# Patient Record
Sex: MALE | Race: ASIAN | Employment: FULL TIME | URBAN - METROPOLITAN AREA
[De-identification: names, ages, dates, MRNs, and addresses within clinical notes are randomized per-mention and may not be internally consistent; named-entity substitution may affect disease eponyms.]

---

## 2024-04-08 ENCOUNTER — HOSPITAL ENCOUNTER (EMERGENCY)
Facility: HOSPITAL | Age: 39
Discharge: HOME | End: 2024-04-09
Attending: STUDENT IN AN ORGANIZED HEALTH CARE EDUCATION/TRAINING PROGRAM
Payer: COMMERCIAL

## 2024-04-08 DIAGNOSIS — J10.1 INFLUENZA B: Primary | ICD-10-CM

## 2024-04-08 LAB
FLUAV RNA SPEC QL NAA+PROBE: NOT DETECTED
FLUBV RNA SPEC QL NAA+PROBE: DETECTED
SARS-COV-2 RNA RESP QL NAA+PROBE: NOT DETECTED

## 2024-04-08 PROCEDURE — 6370000000 HC RX 637 (ALT 250 FOR IP)

## 2024-04-08 PROCEDURE — 87636 SARSCOV2 & INF A&B AMP PRB: CPT

## 2024-04-08 PROCEDURE — 93005 ELECTROCARDIOGRAM TRACING: CPT | Performed by: STUDENT IN AN ORGANIZED HEALTH CARE EDUCATION/TRAINING PROGRAM

## 2024-04-08 PROCEDURE — 99284 EMERGENCY DEPT VISIT MOD MDM: CPT

## 2024-04-08 RX ORDER — GUAIFENESIN 600 MG/1
1200 TABLET, EXTENDED RELEASE ORAL 2 TIMES DAILY
Qty: 20 TABLET | Refills: 0 | Status: SHIPPED | OUTPATIENT
Start: 2024-04-08 | End: 2024-04-13

## 2024-04-08 RX ORDER — ACETAMINOPHEN 500 MG
1000 TABLET ORAL ONCE
Status: COMPLETED | OUTPATIENT
Start: 2024-04-08 | End: 2024-04-08

## 2024-04-08 RX ORDER — IBUPROFEN 600 MG/1
600 TABLET ORAL
Status: COMPLETED | OUTPATIENT
Start: 2024-04-08 | End: 2024-04-08

## 2024-04-08 RX ORDER — IBUPROFEN 600 MG/1
600 TABLET ORAL 3 TIMES DAILY PRN
Qty: 30 TABLET | Refills: 0 | Status: SHIPPED | OUTPATIENT
Start: 2024-04-08

## 2024-04-08 RX ADMIN — ACETAMINOPHEN 1000 MG: 500 TABLET ORAL at 23:15

## 2024-04-08 RX ADMIN — IBUPROFEN 600 MG: 600 TABLET, FILM COATED ORAL at 23:15

## 2024-04-08 ASSESSMENT — PAIN SCALES - GENERAL: PAINLEVEL_OUTOF10: 5

## 2024-04-08 ASSESSMENT — PAIN DESCRIPTION - LOCATION: LOCATION: GENERALIZED

## 2024-04-08 ASSESSMENT — PAIN DESCRIPTION - DESCRIPTORS: DESCRIPTORS: ACHING

## 2024-04-08 ASSESSMENT — PAIN - FUNCTIONAL ASSESSMENT: PAIN_FUNCTIONAL_ASSESSMENT: NONE - DENIES PAIN

## 2024-04-08 ASSESSMENT — PAIN DESCRIPTION - ORIENTATION: ORIENTATION: RIGHT;LEFT

## 2024-04-08 ASSESSMENT — LIFESTYLE VARIABLES
HOW MANY STANDARD DRINKS CONTAINING ALCOHOL DO YOU HAVE ON A TYPICAL DAY: PATIENT DOES NOT DRINK
HOW OFTEN DO YOU HAVE A DRINK CONTAINING ALCOHOL: NEVER

## 2024-04-09 VITALS
HEIGHT: 62 IN | SYSTOLIC BLOOD PRESSURE: 118 MMHG | OXYGEN SATURATION: 99 % | BODY MASS INDEX: 20.28 KG/M2 | TEMPERATURE: 99.9 F | HEART RATE: 111 BPM | RESPIRATION RATE: 22 BRPM | WEIGHT: 110.23 LBS | DIASTOLIC BLOOD PRESSURE: 75 MMHG

## 2024-04-09 LAB
EKG ATRIAL RATE: 111 BPM
EKG DIAGNOSIS: NORMAL
EKG P AXIS: 53 DEGREES
EKG P-R INTERVAL: 160 MS
EKG Q-T INTERVAL: 304 MS
EKG QRS DURATION: 74 MS
EKG QTC CALCULATION (BAZETT): 413 MS
EKG R AXIS: 72 DEGREES
EKG T AXIS: 25 DEGREES
EKG VENTRICULAR RATE: 111 BPM

## 2024-04-09 PROCEDURE — 93010 ELECTROCARDIOGRAM REPORT: CPT | Performed by: INTERNAL MEDICINE

## 2024-04-09 NOTE — ED TRIAGE NOTES
Pt presents ambulatory into ed with sister, a&ox3, no acute distress, breaths even and unlabored c/o subjective fever, cough, congestion and sob since Friday. Reports family member has the flu. Pt denies cardiac hx. Denies chest pain. Reports last took tylenol at 1600 tonight.     Pt offered legal  but refused and requested sister interpret during triage.    <-- Click to add NO significant Past Surgical History

## 2024-04-09 NOTE — DISCHARGE INSTRUCTIONS
Detected (A) NOTD         Radiologic Studies  No orders to display     You were seen today for upper respiratory virus symptoms. Your symptoms appear to be due to a viral illness, influenza B.  If imaging and lab work were done, these are reassuring enough to go home at this time. Viral illnesses are treated with supportive care, including increasing your fluid intake to 8-10 large glasses of water a day, over the counter fever and pain reducers, and rest. Take all other medications as prescribed and directed. If you smoke/ vape, please cut back on usage and try to stop as these can lengthen the time of your symptoms and possibly worsen them as well.     To limit the spread of your symptoms to others you should wash your hands frequently and keep surfaces in your home clean.  You condition should improve over the next 5 days with the care discussed. You should return to the ER- if you experience increased fevers that do not improve with use of Tylenol and Motrin (as directed),  increased shortness of breath, chest pain, changes in vision such as blurry vision, neck stiffness, confusion, or other worsening or worrisome concerns. You should follow up with your primary care physician this week for further evaluation. Call for an appointment today.

## 2024-04-09 NOTE — ED PROVIDER NOTES
indications at this time for antibiotics. Will discuss prompt follow up with PCP. Will discuss warning signs and return precautions.      ED COURSE  ED Course as of 04/08/24 2228 Mon Apr 08, 2024 2118 ED EKG interpretation:9:18 PM  Rhythm: sinus tachycardia;  Rate (approx.): 111; Axis: normal; QRS interval: normal ; ST/T wave: non-specific changes.    [JW]   2227 Rapid Influenza B By PCR(!): Detected [KW]   2227 Rapid Influenza A By PCR: Not detected [KW]   2227 SARS-CoV-2, PCR: Not detected [KW]      ED Course User Index  [JW] Oscar Sol MD  [KW] Selene Hernandez, APRN - NP       Sepsis Reassessment: Sepsis reassessment not applicable    CONSULTS:  None    Patient was given the following medications:  Medications - No data to display    Social Determinants affecting Dx or Tx: Patient lacks a PCP. Given PCP/Free clinic resources.    Smoking Cessation: Smoking Cessation Counseling  Discussed the risks of smoking tobacco products and the long term sequelae of tobacco use with the patient such as increased risk of heart attack, stroke, peripheral artery disease and cancer. The patient verbalized their understanding and were provided resources if asked. Counseled patient for approximately 3 minutes.     Records Reviewed (source and summary of external notes): Prior medical records and Nursing notes.       CLINICAL DECISION TOOLS                     PROCEDURES   Unless otherwise noted above, none  Procedures      CRITICAL CARE TIME   Patient does not meet Critical Care Time, 0 minutes    FINAL IMPRESSION     1. Influenza B          DISPOSITION / PLAN     DISPOSITION      Discharge Note: The patient is stable for discharge home. The signs, symptoms, diagnosis, and discharge instructions have been discussed, understanding conveyed, and agreed upon. The patient is to follow up as recommended or return to ER should their symptoms worsen.       PATIENT REFERRED TO:  [facility] [place] EMERGENCY DEPT  [address]  [facility]